# Patient Record
Sex: FEMALE | Race: WHITE
[De-identification: names, ages, dates, MRNs, and addresses within clinical notes are randomized per-mention and may not be internally consistent; named-entity substitution may affect disease eponyms.]

---

## 2019-06-13 ENCOUNTER — HOSPITAL ENCOUNTER (EMERGENCY)
Dept: HOSPITAL 89 - ER | Age: 49
Discharge: HOME | End: 2019-06-13
Payer: COMMERCIAL

## 2019-06-13 VITALS — SYSTOLIC BLOOD PRESSURE: 118 MMHG | DIASTOLIC BLOOD PRESSURE: 66 MMHG

## 2019-06-13 DIAGNOSIS — J32.0: Primary | ICD-10-CM

## 2019-06-13 LAB — PLATELET COUNT, AUTOMATED: 197 K/UL (ref 150–450)

## 2019-06-13 PROCEDURE — 84132 ASSAY OF SERUM POTASSIUM: CPT

## 2019-06-13 PROCEDURE — 82565 ASSAY OF CREATININE: CPT

## 2019-06-13 PROCEDURE — 82947 ASSAY GLUCOSE BLOOD QUANT: CPT

## 2019-06-13 PROCEDURE — 82310 ASSAY OF CALCIUM: CPT

## 2019-06-13 PROCEDURE — 70450 CT HEAD/BRAIN W/O DYE: CPT

## 2019-06-13 PROCEDURE — 99284 EMERGENCY DEPT VISIT MOD MDM: CPT

## 2019-06-13 PROCEDURE — 82247 BILIRUBIN TOTAL: CPT

## 2019-06-13 PROCEDURE — 70486 CT MAXILLOFACIAL W/O DYE: CPT

## 2019-06-13 PROCEDURE — 84520 ASSAY OF UREA NITROGEN: CPT

## 2019-06-13 PROCEDURE — 84295 ASSAY OF SERUM SODIUM: CPT

## 2019-06-13 PROCEDURE — 85025 COMPLETE CBC W/AUTO DIFF WBC: CPT

## 2019-06-13 PROCEDURE — 82374 ASSAY BLOOD CARBON DIOXIDE: CPT

## 2019-06-13 PROCEDURE — 84450 TRANSFERASE (AST) (SGOT): CPT

## 2019-06-13 PROCEDURE — 84460 ALANINE AMINO (ALT) (SGPT): CPT

## 2019-06-13 PROCEDURE — 81001 URINALYSIS AUTO W/SCOPE: CPT

## 2019-06-13 PROCEDURE — 82435 ASSAY OF BLOOD CHLORIDE: CPT

## 2019-06-13 PROCEDURE — 84075 ASSAY ALKALINE PHOSPHATASE: CPT

## 2019-06-13 PROCEDURE — 82040 ASSAY OF SERUM ALBUMIN: CPT

## 2019-06-13 PROCEDURE — 84155 ASSAY OF PROTEIN SERUM: CPT

## 2019-06-13 NOTE — ER REPORT
History and Physical


Time Seen By MD:  15:24


HPI/ROS


CHIEF COMPLAINT: Fever headache





HISTORY OF PRESENT ILLNESS: Otherwise healthy 48-year-old female long history of


sinus infections had a recent sinal plastic comes in with a complaint of a head


ache and 102 fever about 4 days prior to presentation headache is subsequently 


gotten better she said she had a fever of 101 yesterday no nuchal rigidity but 


did say she has some stiffness all over including her neck but she is not 


complaining of any nuchal rigidity this time was seen by primary care and 


referred here for possible meningitis rule out. On arrival here she is denying 


neck pain of any kind unable to elicit on examination. However she is having 


some sinus tenderness and denies fever at this time no chest pain no cough no 


nausea no vomiting no diarrhea no urinary complaints





REVIEW OF SYSTEMS:


Respiratory: No cough, no dyspnea.


Cardiovascular: No chest pain, no palpitations.


Gastrointestinal: No vomiting, no abdominal pain.


Musculoskeletal: No back pain.


Remainder of the 14 system rev:  Yes


Allergies:  


Coded Allergies:  


     No Known Drug Allergies (Verified  Allergy, Mild, 5/10/07)


Home Meds


Reported Medications


Prenatal Vit/Fe Fumarate/Fa (Prenatal Vitamins Tablet) 1 Tab Tablet, 1 TAB PO


   TAKE ONE TABLET DAILY WHILE NURSING


   5/11/07


Ibuprofen (Motrin) 600 Mg Tab, 600 MG PO


   TAKE ONE TAB THREE TIMES A DAY AS NEEDE FOR PAIN


   5/11/07


Oxycodone/Acetaminophen (***Percocet 5/325) 1 Ea Tab, 0.5 TAB-CAP PO


   TAKE ONE OR TWO TABS PO EVERY 4 HOURS PRN AS NEEDED FOR PAIN


   5/11/07


Reviewed Nurses Notes:  Yes


Old Medical Records Reviewed:  Yes


Constitutional





Vital Sign - Last 24 Hours








 6/13/19





 15:18


 


Temp 98.3


 


Pulse 66


 


Resp 17


 


B/P (MAP) 122/70


 


Pulse Ox 94


 


O2 Delivery Room Air








Physical Exam


  General Appearance: The patient is alert, has no immediate need for airway 


protection and no current signs of toxicity.  [ ]


Eyes: Pupils equal and round no injection.


Respiratory: Chest is non tender, lungs are clear to auscultation.


Cardiac: regular rate and rhythm [ ]


Gastrointestinal: Abdomen is soft and non tender, no masses, bowel sounds 


normal.


Musculoskeletal:  Normal


 Neck is supple and non tender. Negative Kernig's and Brudzinski sign negative 


for nuchal rigidity


   Extremities have full range of motion and are non tender.


Skin: No rashes or lesions.


[ ]


DIFFERENTIAL DIAGNOSIS: After history and physical exam differential diagnosis 


was considered for sinusitis viral upper respiratory viral infection meningitis





Medical Decision Making


Data Points


Result Diagram:  


6/13/19 1530                                                                    


           6/13/19 1530





Laboratory





Hematology








Test


 6/13/19


15:30


 


Red Blood Count


 4.28 M/uL


(4.17-5.56)


 


Mean Corpuscular Volume


 97.8 fL


(80.0-96.0)


 


Mean Corpuscular Hemoglobin


 34.5 pg


(26.0-33.0)


 


Mean Corpuscular Hemoglobin


Concent 35.2 g/dL


(32.0-36.0)


 


Red Cell Distribution Width


 12.5 %


(11.5-14.5)


 


Mean Platelet Volume


 8.9 fL


(7.2-11.1)


 


Neutrophils (%) (Auto)


 59.0 %


(39.4-72.5)


 


Lymphocytes (%) (Auto)


 32.3 %


(17.6-49.6)


 


Monocytes (%) (Auto)


 6.6 %


(4.1-12.4)


 


Eosinophils (%) (Auto)


 1.1 %


(0.4-6.7)


 


Basophils (%) (Auto)


 1.0 %


(0.3-1.4)


 


Nucleated RBC Relative Count


(auto) 0.1 /100WBC 





 


Neutrophils # (Auto)


 3.7 K/uL


(2.0-7.4)


 


Lymphocytes # (Auto)


 2.0 K/uL


(1.3-3.6)


 


Monocytes # (Auto)


 0.4 K/uL


(0.3-1.0)


 


Eosinophils # (Auto)


 0.1 K/uL


(0.0-0.5)


 


Basophils # (Auto)


 0.1 K/uL


(0.0-0.1)


 


Nucleated RBC Absolute Count


(auto) 0.00 K/uL 





 


Urine Color Yellow 


 


Urine Clarity


 Slightly-cloudy





 


Urine pH


 6.0 pH


(4.8-9.5)


 


Urine Specific Gravity 1.015 


 


Urine Protein


 Negative mg/dL


(NEGATIVE)


 


Urine Glucose (UA)


 Negative mg/dL


(NEGATIVE)


 


Urine Ketones


 Negative mg/dL


(NEGATIVE)


 


Urine Blood


 Negative


(NEGATIVE)


 


Urine Nitrite


 Negative


(NEGATIVE)


 


Urine Bilirubin


 Negative


(NEGATIVE)


 


Urine Urobilinogen


 Negative mg/dL


(0.2-1.9)


 


Urine Leukocyte Esterase


 Negative


(NEGATIVE)


 


Urine RBC


 1 /HPF


(0-2/HPF)


 


Urine WBC


 12 /HPF


(0-5/HPF)


 


Urine Squamous Epithelial


Cells Many /LPF


(</=FEW)


 


Urine Bacteria


 Moderate /HPF


(NONE-FEW)


 


Urine Mucus


 None /HPF


(NONE-FEW)


 


Sodium Level


 138 mmol/L


(137-145)


 


Potassium Level


 3.9 mmol/L


(3.5-5.0)


 


Chloride Level


 106 mmol/L


()


 


Carbon Dioxide Level


 22 mmol/L


(22-31)


 


Blood Urea Nitrogen


 12 mg/dl


(7-18)


 


Creatinine


 0.70 mg/dl


(0.52-1.04)


 


Glomerular Filtration Rate


Calc > 60.0 





 


Random Glucose


 108 mg/dl


()


 


Calcium Level


 9.1 mg/dl


(8.4-10.2)


 


Total Bilirubin


 0.3 mg/dl


(0.2-1.3)


 


Aspartate Amino Transf


(AST/SGOT) 29 U/L (0-35) 





 


Alanine Aminotransferase


(ALT/SGPT) 30 U/L (0-56) 





 


Alkaline Phosphatase 48 U/L (0-126) 


 


Total Protein


 7.5 g/dl


(6.3-8.2)


 


Albumin


 4.4 g/dl


(3.5-5.0)








Chemistry








Test


 6/13/19


15:30


 


White Blood Count


 6.3 k/uL


(4.5-11.0)


 


Red Blood Count


 4.28 M/uL


(4.17-5.56)


 


Hemoglobin


 14.8 g/dL


(12.0-16.0)


 


Hematocrit


 41.9 %


(34.0-47.0)


 


Mean Corpuscular Volume


 97.8 fL


(80.0-96.0)


 


Mean Corpuscular Hemoglobin


 34.5 pg


(26.0-33.0)


 


Mean Corpuscular Hemoglobin


Concent 35.2 g/dL


(32.0-36.0)


 


Red Cell Distribution Width


 12.5 %


(11.5-14.5)


 


Platelet Count


 197 K/uL


(150-450)


 


Mean Platelet Volume


 8.9 fL


(7.2-11.1)


 


Neutrophils (%) (Auto)


 59.0 %


(39.4-72.5)


 


Lymphocytes (%) (Auto)


 32.3 %


(17.6-49.6)


 


Monocytes (%) (Auto)


 6.6 %


(4.1-12.4)


 


Eosinophils (%) (Auto)


 1.1 %


(0.4-6.7)


 


Basophils (%) (Auto)


 1.0 %


(0.3-1.4)


 


Nucleated RBC Relative Count


(auto) 0.1 /100WBC 





 


Neutrophils # (Auto)


 3.7 K/uL


(2.0-7.4)


 


Lymphocytes # (Auto)


 2.0 K/uL


(1.3-3.6)


 


Monocytes # (Auto)


 0.4 K/uL


(0.3-1.0)


 


Eosinophils # (Auto)


 0.1 K/uL


(0.0-0.5)


 


Basophils # (Auto)


 0.1 K/uL


(0.0-0.1)


 


Nucleated RBC Absolute Count


(auto) 0.00 K/uL 





 


Urine Color Yellow 


 


Urine Clarity


 Slightly-cloudy





 


Urine pH


 6.0 pH


(4.8-9.5)


 


Urine Specific Gravity 1.015 


 


Urine Protein


 Negative mg/dL


(NEGATIVE)


 


Urine Glucose (UA)


 Negative mg/dL


(NEGATIVE)


 


Urine Ketones


 Negative mg/dL


(NEGATIVE)


 


Urine Blood


 Negative


(NEGATIVE)


 


Urine Nitrite


 Negative


(NEGATIVE)


 


Urine Bilirubin


 Negative


(NEGATIVE)


 


Urine Urobilinogen


 Negative mg/dL


(0.2-1.9)


 


Urine Leukocyte Esterase


 Negative


(NEGATIVE)


 


Urine RBC


 1 /HPF


(0-2/HPF)


 


Urine WBC


 12 /HPF


(0-5/HPF)


 


Urine Squamous Epithelial


Cells Many /LPF


(</=FEW)


 


Urine Bacteria


 Moderate /HPF


(NONE-FEW)


 


Urine Mucus


 None /HPF


(NONE-FEW)


 


Glomerular Filtration Rate


Calc > 60.0 





 


Calcium Level


 9.1 mg/dl


(8.4-10.2)


 


Total Bilirubin


 0.3 mg/dl


(0.2-1.3)


 


Aspartate Amino Transf


(AST/SGOT) 29 U/L (0-35) 





 


Alanine Aminotransferase


(ALT/SGPT) 30 U/L (0-56) 





 


Alkaline Phosphatase 48 U/L (0-126) 


 


Total Protein


 7.5 g/dl


(6.3-8.2)


 


Albumin


 4.4 g/dl


(3.5-5.0)








Urinalysis








Test


 6/13/19


15:30


 


Urine Color Yellow 


 


Urine Clarity


 Slightly-cloudy





 


Urine pH


 6.0 pH


(4.8-9.5)


 


Urine Specific Gravity 1.015 


 


Urine Protein


 Negative mg/dL


(NEGATIVE)


 


Urine Glucose (UA)


 Negative mg/dL


(NEGATIVE)


 


Urine Ketones


 Negative mg/dL


(NEGATIVE)


 


Urine Blood


 Negative


(NEGATIVE)


 


Urine Nitrite


 Negative


(NEGATIVE)


 


Urine Bilirubin


 Negative


(NEGATIVE)


 


Urine Urobilinogen


 Negative mg/dL


(0.2-1.9)


 


Urine Leukocyte Esterase


 Negative


(NEGATIVE)


 


Urine RBC


 1 /HPF


(0-2/HPF)


 


Urine WBC


 12 /HPF


(0-5/HPF)


 


Urine Squamous Epithelial


Cells Many /LPF


(</=FEW)


 


Urine Bacteria


 Moderate /HPF


(NONE-FEW)


 


Urine Mucus


 None /HPF


(NONE-FEW)











ED Course/Re-evaluation


ED Course


ED course medical decision 48 showed tumor comes in with a headache intermittent


and some fevers as well she is afebrile on arrival here her exam is shows no 


consistency with meningitis she has no nuchal rigidity she has no Kernig's or 


Brudzinski sign she has no headache at this time however she does have a CT scan


confirming maxillary bilateral sinus infection she does have a long history 


injection at [place] [facility]asty done before patient will be diagnosed with sinusitis 


which is the etiology I do not believe this to be a bacterial even viral 


meningitis this time with the understanding to come back if her symptoms worsen 


I will start her on by mouth antibiotics this time with primary care follow-up


Decision to Disposition Date:  Jun 13, 2019


Decision to Disposition Time:  16:48





Depart


Departure


Latest Vital Signs





Vital Signs








  Date Time  Temp Pulse Resp B/P (MAP) Pulse Ox O2 Delivery O2 Flow Rate FiO2


 


6/13/19 15:18 98.3 66 17 122/70 94 Room Air  








Impression:  


   Primary Impression:  


   Sinusitis


Condition:  Improved


Disposition:  HOME OR SELF-CARE


Referrals:  


LAURA PLATT MD (PCP)


5 Days


New Scripts


 Amoxicillin 500 Mg Tab (AMOXICILLIN 500 MG TAB) 500 Mg Tablet


2 TAB PO Q12H, #56 TAB


   TAKE TWO TABLETS BY MOUTH EVERY 12 HOURS


   Prov: CON MOONEY MD         6/13/19


Patient Instructions:  Sinusitis (ED)











CON MOONEY MD           Jun 13, 2019 15:26

## 2019-06-13 NOTE — RADIOLOGY IMAGING REPORT
FACILITY: [facility] 

 

PATIENT NAME: Ana Maria Shaw

: 1970

MR: [id number]

V: [id number]

EXAM DATE: 

ORDERING PHYSICIAN: CON MOONEY

TECHNOLOGIST: 

 

[place] [facility] [place]

Patient: Ana Maria Shaw

: 1970

MRN: [id number]

Visit/Account:[id number]

Date of Sevice:  2019

 

ACCESSION #: [id number]

 

EXAMINATION:

CT sinus without IV contrast

 

HISTORY:   Fever.  History of septoplasty.

 

COMPARISON:  CT head from 2019.

 

TECHNIQUE:  Contiguous axial images were obtained through the paranasal sinuses without intravenous c
ontrast administration. Coronal and sagittal reformatted images were obtained from the axial source d
jonel.

 

One of the following dose optimization techniques was utilized in the performance of this exam: Autom
ated exposure control; adjustment of the mA and/or kV according to the patient's size; or use of an i
terative  reconstruction technique.  Specific details can be referenced in the facility's radiology C
T exam operational policy.

 

FINDINGS:

 

Maxillary sinuses: Minimal mucosal thickening in the bilateral maxillary sinuses.  There is an uncomp
licated right [place] cell.

Frontal sinuses:  Hypoplastic right frontal sinus.  The frontal sinuses are clear.

Ethmoid air cells: Minimal mucosal thickening in the bilateral ethmoid air cells.

Sphenoid sinuses: Negative.

Ostiomeatal units:  Patent.

 

Nasal septum/nasal cavity: Previous septoplasty with 5 mm defect versus thinning of the anterior nasa
l septum.  The septum is midline.

 

Orbits: Negative.

Visualized intracranial contents/soft tissues: Negative.

TMJs:  Negative.

 

IMPRESSION:

 

1.  Minimal nonobstructive inflammation of the bilateral maxillary sinuses and ethmoid air cells, wit
hout other evidence of sinusitis.

 

2.  Uncomplicated [place] cell in the right maxillary sinus.

 

3.  Previous septoplasty with a 5 mm defect versus thinning of the anterior nasal septum.

 

Report Dictated By: Ama Ferreira MD at 2019 4:24 PM

 

Report E-Signed By: Ama Ferreira MD  at 2019 4:38 PM

 

WSN:[id number]

## 2019-06-13 NOTE — RADIOLOGY IMAGING REPORT
[place] [facility]IENT NAME: Ana Maria Shaw

: 1970

MR: [id number]

V: [id number]

EXAM DATE: 

ORDERING PHYSICIAN: CON MOONEY

TECHNOLOGIST: 

 

Location: [facility] - [place]

Patient: Ana Maria Shaw

: 1970

MRN: [id number]

Visit/Account:[id number]

Date of Sevice:  2019

 

ACCESSION #: [id number]

 

EXAMINATION:

CT head without IV contrast

 

HISTORY:  Fever.

 

COMPARISON:  None.

 

TECHNIQUE:   Contiguous axial images were obtained from the skull base to the vertex without intraven
ous contrast.  Sagittal and coronal reformatted images are also submitted.

 

One of the following dose optimization techniques was utilized in the performance of this exam: Autom
ated exposure control; adjustment of the mA and/or kV according to the patient's size; or use of an i
terative  reconstruction technique.  Specific details can be referenced in the facility's radiology C
T exam operational policy.

 

FINDINGS:

 

Brain volume:  Normal.

 

Ventricles:  Normal.

 

Acute ischemic changes:  None.

 

Hemorrhage:  No acute intracranial hemorrhage.

 

Masses/edema:  None.

 

Gray-white: Negative.

 

White matter:  Normal.

 

Vessels:  Negative.

 

Extra-axial:  Negative.

 

Calvarium/scalp:  Negative.

 

Skull base/visualized face:  Negative.

 

Visualized sinuses/orbits:  Negative.

 

IMPRESSION:

 

No acute hemorrhage or intracranial mass lesion. No CT evidence of acute infarct or intracranial infe
ction.

 

Report Dictated By: Ama Ferreira MD at 2019 4:22 PM

 

Report E-Signed By: Ama Ferreira MD  at 2019 4:24 PM

 

WSN:[id number]